# Patient Record
(demographics unavailable — no encounter records)

---

## 2025-01-02 NOTE — HISTORY OF PRESENT ILLNESS
[FreeTextEntry1] : Location: right anterior ankle Severity: severe Duration: 1 wk Context: has knee oa bilatly Aggravating Factors: walking, stairs Alleviating Factors: rest Associated Symptoms: denies weight loss, fever, chills, change in bowel/bladder habits, weakness, numbness/tingling, radiation down leg Prior Studies:  Back hurting for years.  He has pain with walking.  NO radiation down legs, numbness.  One surgeon recommended surgery.

## 2025-01-02 NOTE — PHYSICAL EXAM
[FreeTextEntry1] : FRANCISCA is a 72 year old male Constitutional: healthy appearing, NAD, and obese   LUMBAR ROM: flexion to 30 deg, ext to 5 deg   Gait: antalgic   Inspection: no erythema, warmth Spine: no TTP in spinous process Bony palpation: no TTP in GT   Soft tissue palpation hip: no TTP in gluteus chauncey Soft tissue palpation of spine: no TTP in lumbar paraspinals   5/5 bilateral KE, DF, PF sensation intact in bilat LE reflexes: knee                ankle   Special tests: neg seated SLR  right ankle: mild swelling, no erythema, warmth TTP in ant tib tendon neg inversion test, anterior drawer

## 2025-01-02 NOTE — ASSESSMENT
[FreeTextEntry1] : Discussed diagnosis and treatment plan including PT. get lace up ankle brace RICE - elevate above heart a few times a day will need to lose weight consider facet injections